# Patient Record
Sex: FEMALE | Race: WHITE | HISPANIC OR LATINO | Employment: FULL TIME | URBAN - METROPOLITAN AREA
[De-identification: names, ages, dates, MRNs, and addresses within clinical notes are randomized per-mention and may not be internally consistent; named-entity substitution may affect disease eponyms.]

---

## 2018-09-01 ENCOUNTER — HOSPITAL ENCOUNTER (EMERGENCY)
Facility: HOSPITAL | Age: 48
Discharge: HOME | End: 2018-09-01
Attending: EMERGENCY MEDICINE
Payer: MEDICAID

## 2018-09-01 VITALS
WEIGHT: 150 LBS | HEART RATE: 95 BPM | RESPIRATION RATE: 16 BRPM | BODY MASS INDEX: 29.45 KG/M2 | TEMPERATURE: 99 F | OXYGEN SATURATION: 96 % | DIASTOLIC BLOOD PRESSURE: 74 MMHG | HEIGHT: 60 IN | SYSTOLIC BLOOD PRESSURE: 153 MMHG

## 2018-09-01 DIAGNOSIS — L03.211 CELLULITIS, FACE: Primary | ICD-10-CM

## 2018-09-01 DIAGNOSIS — R23.8 PIMPLES: ICD-10-CM

## 2018-09-01 PROCEDURE — 99283 EMERGENCY DEPT VISIT LOW MDM: CPT

## 2018-09-01 RX ORDER — FLUTICASONE PROPIONATE 50 MCG
1 SPRAY, SUSPENSION (ML) NASAL DAILY
COMMUNITY

## 2018-09-01 RX ORDER — CETIRIZINE HYDROCHLORIDE 10 MG/1
10 TABLET ORAL DAILY
COMMUNITY

## 2018-09-01 NOTE — ED PROVIDER NOTES
Encounter Date: 9/1/2018    SCRIBE #1 NOTE: I, Martha Tripp, am scribing for, and in the presence of,  Dr. Duke. I have scribed the entire note.       History     Chief Complaint   Patient presents with    Facial Swelling     left sided facial swelling and redness x 3 days with no h/o injury.     PIMPLE LEFT FACE      The history is provided by the patient.   Rash    The current episode started several days ago (3 DAYS). The problem has been unchanged. The problem is associated with an unknown factor. The rash is present on the face (LEFT SIDE). The pain has been constant since onset. Associated symptoms include pain. Associated symptoms comments: FACIAL SWELLING AND REDNESS. She has tried nothing for the symptoms.     Review of patient's allergies indicates:  No Known Allergies  Past Medical History:   Diagnosis Date    Asthma     Seasonal allergies     Thyroid disease      History reviewed. No pertinent surgical history.  History reviewed. No pertinent family history.  Social History     Tobacco Use    Smoking status: Never Smoker   Substance Use Topics    Alcohol use: No     Frequency: Never    Drug use: Not on file     Review of Systems   Constitutional: Negative for fever.   HENT: Negative for drooling and ear pain.    Eyes: Negative for pain.   Skin: Positive for rash.   All other systems reviewed and are negative.      Physical Exam     Initial Vitals [09/01/18 1108]   BP Pulse Resp Temp SpO2   (!) 153/74 95 16 98.7 °F (37.1 °C) 96 %      MAP       --         Physical Exam    Vitals reviewed.  Constitutional: She appears well-developed.   HENT:   Head: Atraumatic.   Right Ear: External ear normal.   Left Ear: External ear normal.   Mouth/Throat: Oropharynx is clear and moist.   LEFT SUB ORBITAL 1 CM ERYTHEMA WITH PUSTULE MM'S, NO VESSICLES .  NONDRAINING OR STREAKING  TMS EQUAL  NO MENINGEAL SIGNS   Eyes: EOM are normal. Pupils are equal, round, and reactive to light.   NO PERIORBITAL ERYTHEMA OR  VESICULAR LESIONS FACE OR EAR   Neck: Normal range of motion. Neck supple. No tracheal deviation present. No JVD present.   Cardiovascular: Normal rate.   Pulmonary/Chest: No respiratory distress.   Musculoskeletal: Normal range of motion.   Neurological: She is alert and oriented to person, place, and time.   Skin: Skin is warm and dry.   Psychiatric: She has a normal mood and affect.         ED Course   Procedures  Labs Reviewed - No data to display       Imaging Results    None          Medical Decision Making:   ED Management:  NO SIGNS OF MENINGITIS OR ORBITAL CELLULITIS                      Clinical Impression:    FACIAL CELLULITIS    Disposition:   Disposition: Discharged  Condition: Stable    I, Dr. Winston Duke, personally performed the services described in this documentation. All medical record entries made by the scribe were at my direction and in my presence.  I have reviewed the chart and agree that the record reflects my personal performance. Winston Duke D.O. 11:31 AM09/01/2018                      Winston Duke,   09/01/18 1147

## 2025-01-17 DIAGNOSIS — L40.9 PSORIASIS: Primary | ICD-10-CM
